# Patient Record
Sex: FEMALE | Race: BLACK OR AFRICAN AMERICAN | NOT HISPANIC OR LATINO | Employment: FULL TIME | URBAN - METROPOLITAN AREA
[De-identification: names, ages, dates, MRNs, and addresses within clinical notes are randomized per-mention and may not be internally consistent; named-entity substitution may affect disease eponyms.]

---

## 2017-01-09 ENCOUNTER — PATIENT MESSAGE (OUTPATIENT)
Dept: PAIN MEDICINE | Facility: CLINIC | Age: 32
End: 2017-01-09

## 2017-01-18 ENCOUNTER — HOSPITAL ENCOUNTER (OUTPATIENT)
Dept: RADIOLOGY | Facility: OTHER | Age: 32
Discharge: HOME | End: 2017-01-18
Attending: NURSE PRACTITIONER
Payer: COMMERCIAL

## 2017-01-18 ENCOUNTER — OFFICE VISIT (OUTPATIENT)
Dept: PAIN MEDICINE | Facility: CLINIC | Age: 32
End: 2017-01-18
Payer: COMMERCIAL

## 2017-01-18 VITALS
WEIGHT: 163.38 LBS | SYSTOLIC BLOOD PRESSURE: 120 MMHG | RESPIRATION RATE: 18 BRPM | HEIGHT: 62 IN | DIASTOLIC BLOOD PRESSURE: 83 MMHG | HEART RATE: 86 BPM | TEMPERATURE: 98 F | BODY MASS INDEX: 30.07 KG/M2

## 2017-01-18 DIAGNOSIS — M54.2 NECK PAIN: Primary | ICD-10-CM

## 2017-01-18 DIAGNOSIS — M54.2 NECK PAIN: ICD-10-CM

## 2017-01-18 DIAGNOSIS — M54.16 LEFT LUMBAR RADICULOPATHY: ICD-10-CM

## 2017-01-18 DIAGNOSIS — M51.16 LUMBAR DISC HERNIATION WITH RADICULOPATHY: ICD-10-CM

## 2017-01-18 DIAGNOSIS — M53.3 SACROILIAC JOINT PAIN: ICD-10-CM

## 2017-01-18 DIAGNOSIS — M54.2 CERVICALGIA: ICD-10-CM

## 2017-01-18 PROCEDURE — 99213 OFFICE O/P EST LOW 20 MIN: CPT | Mod: S$GLB,,, | Performed by: NURSE PRACTITIONER

## 2017-01-18 PROCEDURE — 72052 X-RAY EXAM NECK SPINE 6/>VWS: CPT | Mod: TC

## 2017-01-18 PROCEDURE — 1159F MED LIST DOCD IN RCRD: CPT | Mod: S$GLB,,, | Performed by: NURSE PRACTITIONER

## 2017-01-18 PROCEDURE — 99999 PR PBB SHADOW E&M-EST. PATIENT-LVL III: CPT | Mod: PBBFAC,,, | Performed by: NURSE PRACTITIONER

## 2017-01-18 PROCEDURE — 72052 X-RAY EXAM NECK SPINE 6/>VWS: CPT | Mod: 26,,, | Performed by: INTERNAL MEDICINE

## 2017-01-18 RX ORDER — DICLOFENAC SODIUM 20 MG/G
2 SOLUTION TOPICAL 2 TIMES DAILY
Qty: 2 BOTTLE | Refills: 2 | Status: SHIPPED | OUTPATIENT
Start: 2017-01-18 | End: 2017-04-18

## 2017-01-18 NOTE — PROGRESS NOTES
Chronic Pain - Established Visit    Referring Physician: No ref. provider found    Chief Complaint: Back Pain    Interval History 1/18/2017:  The patient returns today for follow up of lower back and left leg.  It radiates down the back of her leg to her calf.  She did have left L5 and S1 TF BRIA on 8/31/16 with 80% relief for about 4 months.  She would like to repeat the injection.  She denies any bowel or bladder incontinence or signs of saddle paresthesia.  She is also complaining of right sided neck/shoulder and right hand pain.  This began a few weeks ago and is worse at night.  She denies any numbness or tingling.  She reports that the pain begins in her right index finger and shoots upward into her right shoulder.  She does take Flexeril but it causes sedation.  Her pain today is 6/10.        Interval History 8/23/2016:  The patient returns today for follow up of lower back pain.  The pain radiates down the back of her leg to her lateral ankle.  She denies any radiation into her toes or heel at this time.  She has intermittent numbness while seated.  She is taking Mobic 7.5 mg daily and Flexeril PRN with mild relief.  She has been participating in PT at [facility] with limited relief.  She has a known herniation at L5-S1 with left NF narrowing from previous MRI.  She would like to further discuss an injection.  Her pain today is 5/10.  The patient denies any bowel or bladder incontinence or signs of saddle paresthesia.  The patient denies any major medical changes since last office visit.    Initial Encounter 7/8/2016:    Cheryl Kemp presents to the clinic for the evaluation of low back pain. 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    Brief history:     Pain Description:    The pain is located in the lumbar area and radiates to the left lower extremity in the L4/5 distribution.      At BEST   1/10     At WORST  9/10 on the WORST day.      On average pain is rated as 6/10.     Today the pain is rated as 4/10    The pain is described as sharp and shooting      Symptoms interfere with daily activity, sleeping and work.     Exacerbating factors: Sitting, Bending, Lifting and Getting out of bed/chair.      Mitigating factors ice, laying down and medications.     Patient denies night fever/night sweats, urinary incontinence, bowel incontinence, significant weight loss, significant motor weakness and loss of sensations.  Patient denies any suicidal or homicidal ideations    Pain Medications:  Current:  Mobic 7.5 mg QD  Flexeril 10mg qhs  Elavil 25mg at night    Tried in Past:  NSAIDs - yes  SNRI -Never  Anti-convulsants -Never  Muscle Relaxants - yes  Opioids-Vicodin -without relief    Physical Therapy/Home Exercise: yes      report:  Reviewed and consistent with medication use as prescribed.    Pain Procedures:   8/31/2016- Left L5 and S1 TF BRIA- 80% relief for 4 months    Chiropractor -never  Acupuncture - never  TENS unit -never  Spinal decompression -never  Joint replacement -never    Imaging:     Lumbar MRI- L5-S1 left disc herniation with NF narrowing.      Xray Lumbar 04/12/2016:  Comparison: MRI of the lumbar spine 1/2011.  Radiographs of the lumbar spine: 9/2006.  3/2005.    Number of views: 3.    Findings:  There are 5 non-rib bearing lumbar type vertebral bodies with vertebral body heights, alignment and intervertebral disc spaces maintained.  L5 has transitional nature as observed previously.  There is incomplete fusion of the posterior elements at S1, stable compared with earlier studies.    I do not detect spondylolysis or spondylolisthesis.    Lumbar disc spaces are maintained.  Posterior aspect of the L5-S1 disc space is incompletely visualized on lateral view likely due to obliquity.    Sacroiliac joints appear patent in their imaged extent.   Impression     The source of the patient's pain  is not established on this study.            Past Medical History   Diagnosis Date    Anemia     Cervix dysplasia     Hypoglycemia     Migraines, neuralgic      Past Surgical History   Procedure Laterality Date    Breast surgery Right 2008     MASS    Cryotherapy       18 years old     Social History     Social History    Marital status: Single     Spouse name: N/A    Number of children: N/A    Years of education: N/A     Occupational History    Not on file.     Social History Main Topics    Smoking status: Never Smoker    Smokeless tobacco: Never Used    Alcohol use No    Drug use: No    Sexual activity: Yes     Partners: Male     Birth control/ protection: None     Other Topics Concern    Not on file     Social History Narrative     Family History   Problem Relation Age of Onset    Diabetes Paternal Grandfather     Diabetes Paternal Grandmother     Diabetes Maternal Grandfather     Hypertension Father     Miscarriages / Stillbirths Mother     Endometriosis Mother     Breast cancer Other     Breast cancer Cousin     Breast cancer Cousin     Colon cancer Neg Hx     Ovarian cancer Neg Hx        Review of patient's allergies indicates:   Allergen Reactions    Amoxicillin      Stomach upset       Current Outpatient Prescriptions   Medication Sig    amitriptyline (ELAVIL) 25 MG tablet Take 25 mg by mouth nightly as needed. 1-3 Tablet Oral At bedtime    dexamethasone (DECADRON) 4 MG Tab Take 4 mg by mouth once daily.    escitalopram oxalate (LEXAPRO) 20 MG tablet     LIDOCAINE VISCOUS 2 % solution Take 10 mLs by mouth as directed.     No current facility-administered medications for this visit.        REVIEW OF SYSTEMS:    GENERAL:  No weight loss, malaise or fevers.  HEENT:   No recent changes in vision or hearing  NECK:  Negative for lumps, no difficulty with swallowing.  RESPIRATORY:  Negative for cough, wheezing or shortness of breath, patient denies any recent URI.  CARDIOVASCULAR:   "Negative for chest pain, leg swelling or palpitations.  GI:  Negative for abdominal discomfort, blood in stools or black stools or change in bowel habits.  MUSCULOSKELETAL:  See HPI.  SKIN:  Negative for lesions, rash, and itching.  PSYCH:  No mood disorder or recent psychosocial stressors.  Patient's sleep is disturbed secondary to pain.  HEMATOLOGY/LYMPHOLOGY:  Negative for prolonged bleeding, bruising easily or swollen nodes.  Patient is not currently taking any anti-coagulants.  ENDO: No history of diabetes or thyroid dysfunction  NEURO:   No history of headaches, syncope, paralysis, seizures or tremors.  All other reviewed and negative other than HPI.    OBJECTIVE:    Visit Vitals    /83    Pulse 86    Temp 98.4 °F (36.9 °C) (Oral)    Resp 18    Ht 5' 2" (1.575 m)    Wt 74.1 kg (163 lb 5.8 oz)    BMI 29.88 kg/m2       PHYSICAL EXAMINATION:    GENERAL: Well appearing, in no acute distress, alert and oriented x3.  PSYCH:  Mood and affect appropriate.  SKIN: Skin color, texture, turgor normal, no rashes or lesions.  HEAD/FACE:  Normocephalic, atraumatic. Cranial nerves grossly intact.  CV: RRR with palpation of the radial artery.  PULM: No evidence of respiratory difficulty, symmetric chest rise.  NECK: Full ROM with pain on extension and lateral rotation. There is pain with palpation to right trapezius.  Spurling was positive on the right side.  Negative facet loading.   BACK: Straight leg raising in the sitting and supine positions is positive to radicular pain on the left at the L5 distribution. There is no pain with palpation over the facet joints of the lumbar spine.  Full ROM with pain on extension. Positive facet loading to left.    EXTREMITIES: Peripheral joint ROM is full and pain free without obvious instability or laxity in all four extremities. No deformities, edema, or skin discoloration. Good capillary refill.  MUSCULOSKELETAL: Hip, and knee provocative maneuvers are negative.  There is " pain with palpation over the sacroiliac joint on the left.  There is no pain to palpation over the greater trochanteric bursa bilaterally.  FABERs test is positive on the left.  FADIRs test is negative.   Bilateral lower extremity strength is normal and symmetric.  No atrophy or tone abnormalities are noted.  NEURO: Bilateral upper and lower extremity coordination and muscle stretch reflexes are physiologic and symmetric.  Plantar response are downgoing. No clonus.  No loss of sensation is noted.  GAIT: Normal.      ASSESSMENT: 31 y.o. year old female with lower back and left leg pain, consistent with the following diagnoses:    Encounter Diagnoses   Name Primary?    Neck pain Yes    Left lumbar radiculopathy     Lumbar disc herniation with radiculopathy     Sacroiliac joint pain     Cervicalgia        PLAN:     - Previous imaging was reviewed and discussed with the patient today.    - Schedule left L5 and S1 TF BRIA. The procedure, risks, benefits and options were discussed with patient. There are no contraindications to the procedure. The patient expressed understanding and agreed to proceed.  Consent obtained today.    - Obtain cervical X-rays with flexion and extension to evaluate her pain.     - Trial Pennsaid for right shoulder and neck pain. Her pain today seems muscular in origin.     - In the future, consider obtaining cervical MRI if radicular symptoms continue.     - Continue Flexeril 10 mg PRN muscle spasms and Mobic 7.5 mg daily.      - The patient will continue a home exercise routine to help with pain and strengthening.      - F/U 2 weeks after procedure.    - Dr. Russo was consulted on the patient and agrees with this plan.      The above plan and management options were discussed at length with patient. Patient is in agreement with the above and verbalized understanding.     Alejandra Almazan  01/18/2017

## 2017-01-18 NOTE — MR AVS SNAPSHOT
[facility]  [address]  [place] [address]  Phone: [phone]  Fax: [phone]                  Cheryl Kemp   2017 7:40 AM   Office Visit    Description:  Female : 1985   Provider:  KRISTOPHER Hodge   Department:  [facility]           Reason for Visit     Low-back Pain     Hand Pain           Diagnoses this Visit        Comments    Neck pain    -  Primary     Left lumbar radiculopathy         Lumbar disc herniation with radiculopathy                To Do List           Goals (5 Years of Data)     None      [facility]     [facility] Nurse Care Line -  Assistance  Registered nurses in the [facility] provide clinical advisement, health education, appointment booking, and other advisory services.  Call for this free service at [phone].             Medications           Message regarding Medications     Verify the changes and/or additions to your medication regime listed below are the same as discussed with your clinician today.  If any of these changes or additions are incorrect, please notify your healthcare provider.             Verify that the below list of medications is an accurate representation of the medications you are currently taking.  If none reported, the list may be blank. If incorrect, please contact your healthcare provider. Carry this list with you in case of emergency.           Current Medications     amitriptyline (ELAVIL) 25 MG tablet Take 25 mg by mouth nightly as needed. 1-3 Tablet Oral At bedtime    dexamethasone (DECADRON) 4 MG Tab Take 4 mg by mouth once daily.    escitalopram oxalate (LEXAPRO) 20 MG tablet     LIDOCAINE VISCOUS 2 % solution Take 10 mLs by mouth as directed.           Clinical Reference Information           Vital Signs - Last Recorded  Most recent update: 2017  8:42 AM by Ciarra Guo MA    BP Pulse Temp Resp Ht Wt    120/83 86 98.4 °F (36.9 °C) (Oral) 18  "5' 2" (1.575 m) 74.1 kg (163 lb 5.8 oz)    BMI                29.88 kg/m2          Blood Pressure          Most Recent Value    BP  120/83      Allergies as of 1/18/2017     Amoxicillin      Immunizations Administered on Date of Encounter - 1/18/2017     None      Orders Placed During Today's Visit     Future Labs/Procedures Expected by Expires    X-Ray Cervical Spine 5 View W Flex Extxt  1/18/2017 1/18/2018      "

## 2017-02-22 ENCOUNTER — SURGERY (OUTPATIENT)
Age: 32
End: 2017-02-22

## 2017-02-22 ENCOUNTER — HOSPITAL ENCOUNTER (OUTPATIENT)
Facility: OTHER | Age: 32
Discharge: HOME | End: 2017-02-22
Attending: ANESTHESIOLOGY | Admitting: ANESTHESIOLOGY
Payer: COMMERCIAL

## 2017-02-22 VITALS
RESPIRATION RATE: 18 BRPM | HEIGHT: 63 IN | OXYGEN SATURATION: 100 % | WEIGHT: 160 LBS | HEART RATE: 85 BPM | SYSTOLIC BLOOD PRESSURE: 120 MMHG | TEMPERATURE: 98 F | BODY MASS INDEX: 28.35 KG/M2 | DIASTOLIC BLOOD PRESSURE: 8 MMHG

## 2017-02-22 DIAGNOSIS — M54.16 LUMBAR RADICULOPATHY: ICD-10-CM

## 2017-02-22 DIAGNOSIS — M51.16 LUMBAR DISC HERNIATION WITH RADICULOPATHY: Primary | ICD-10-CM

## 2017-02-22 LAB
B-HCG UR QL: NEGATIVE
CTP QC/QA: YES

## 2017-02-22 PROCEDURE — 81025 URINE PREGNANCY TEST: CPT | Performed by: ANESTHESIOLOGY

## 2017-02-22 PROCEDURE — 64483 NJX AA&/STRD TFRM EPI L/S 1: CPT | Performed by: ANESTHESIOLOGY

## 2017-02-22 PROCEDURE — 64483 NJX AA&/STRD TFRM EPI L/S 1: CPT | Mod: LT,,, | Performed by: ANESTHESIOLOGY

## 2017-02-22 PROCEDURE — 64484 NJX AA&/STRD TFRM EPI L/S EA: CPT | Performed by: ANESTHESIOLOGY

## 2017-02-22 PROCEDURE — 64484 NJX AA&/STRD TFRM EPI L/S EA: CPT | Mod: LT,,, | Performed by: ANESTHESIOLOGY

## 2017-02-22 PROCEDURE — 63600175 PHARM REV CODE 636 W HCPCS: Performed by: ANESTHESIOLOGY

## 2017-02-22 PROCEDURE — 25500020 PHARM REV CODE 255: Performed by: ANESTHESIOLOGY

## 2017-02-22 PROCEDURE — 99152 MOD SED SAME PHYS/QHP 5/>YRS: CPT | Mod: ,,, | Performed by: ANESTHESIOLOGY

## 2017-02-22 PROCEDURE — 25000003 PHARM REV CODE 250: Performed by: ANESTHESIOLOGY

## 2017-02-22 RX ORDER — METHYLPREDNISOLONE ACETATE 40 MG/ML
40 INJECTION, SUSPENSION INTRA-ARTICULAR; INTRALESIONAL; INTRAMUSCULAR; SOFT TISSUE ONCE
Status: DISCONTINUED | OUTPATIENT
Start: 2017-02-22 | End: 2017-02-22 | Stop reason: HOSPADM

## 2017-02-22 RX ORDER — BUPIVACAINE HYDROCHLORIDE 2.5 MG/ML
10 INJECTION, SOLUTION EPIDURAL; INFILTRATION; INTRACAUDAL ONCE
Status: DISCONTINUED | OUTPATIENT
Start: 2017-02-22 | End: 2017-02-22 | Stop reason: HOSPADM

## 2017-02-22 RX ORDER — LIDOCAINE HYDROCHLORIDE 10 MG/ML
10 INJECTION INFILTRATION; PERINEURAL
Status: DISCONTINUED | OUTPATIENT
Start: 2017-02-22 | End: 2017-02-22 | Stop reason: HOSPADM

## 2017-02-22 RX ORDER — METHYLPREDNISOLONE ACETATE 40 MG/ML
INJECTION, SUSPENSION INTRA-ARTICULAR; INTRALESIONAL; INTRAMUSCULAR; SOFT TISSUE
Status: DISCONTINUED | OUTPATIENT
Start: 2017-02-22 | End: 2017-02-22 | Stop reason: HOSPADM

## 2017-02-22 RX ORDER — SODIUM CHLORIDE 9 MG/ML
INJECTION, SOLUTION INTRAVENOUS CONTINUOUS
Status: DISCONTINUED | OUTPATIENT
Start: 2017-02-22 | End: 2017-02-22 | Stop reason: HOSPADM

## 2017-02-22 RX ORDER — BUPIVACAINE HYDROCHLORIDE 2.5 MG/ML
INJECTION, SOLUTION EPIDURAL; INFILTRATION; INTRACAUDAL
Status: DISCONTINUED | OUTPATIENT
Start: 2017-02-22 | End: 2017-02-22 | Stop reason: HOSPADM

## 2017-02-22 RX ORDER — MIDAZOLAM HYDROCHLORIDE 1 MG/ML
2 INJECTION INTRAMUSCULAR; INTRAVENOUS
Status: DISCONTINUED | OUTPATIENT
Start: 2017-02-22 | End: 2017-02-22 | Stop reason: HOSPADM

## 2017-02-22 RX ORDER — MIDAZOLAM HYDROCHLORIDE 1 MG/ML
INJECTION, SOLUTION INTRAMUSCULAR; INTRAVENOUS
Status: DISCONTINUED | OUTPATIENT
Start: 2017-02-22 | End: 2017-02-22 | Stop reason: HOSPADM

## 2017-02-22 RX ORDER — FENTANYL CITRATE 50 UG/ML
100 INJECTION, SOLUTION INTRAMUSCULAR; INTRAVENOUS ONCE
Status: DISCONTINUED | OUTPATIENT
Start: 2017-02-22 | End: 2017-02-22 | Stop reason: HOSPADM

## 2017-02-22 RX ORDER — LIDOCAINE HYDROCHLORIDE 10 MG/ML
INJECTION INFILTRATION; PERINEURAL
Status: DISCONTINUED | OUTPATIENT
Start: 2017-02-22 | End: 2017-02-22 | Stop reason: HOSPADM

## 2017-02-22 RX ADMIN — BUPIVACAINE HYDROCHLORIDE 10 ML: 2.5 INJECTION, SOLUTION EPIDURAL; INFILTRATION; INTRACAUDAL; PERINEURAL at 02:02

## 2017-02-22 RX ADMIN — METHYLPREDNISOLONE ACETATE 40 MG: 40 INJECTION, SUSPENSION INTRA-ARTICULAR; INTRALESIONAL; INTRAMUSCULAR; SOFT TISSUE at 02:02

## 2017-02-22 RX ADMIN — MIDAZOLAM 2 MG: 1 INJECTION INTRAMUSCULAR; INTRAVENOUS at 02:02

## 2017-02-22 RX ADMIN — IOHEXOL 5 ML: 300 INJECTION, SOLUTION INTRAVENOUS at 02:02

## 2017-02-22 RX ADMIN — SODIUM CHLORIDE: 0.9 INJECTION, SOLUTION INTRAVENOUS at 02:02

## 2017-02-22 RX ADMIN — LIDOCAINE HYDROCHLORIDE 10 ML: 10 INJECTION, SOLUTION INFILTRATION; PERINEURAL at 02:02

## 2017-02-22 NOTE — DISCHARGE INSTRUCTIONS

## 2017-02-22 NOTE — DISCHARGE SUMMARY
Discharge Note  Short Stay      SUMMARY     Admit Date: 2/22/2017    Attending Physician: Aleksandra Russo      Discharge Physician: Aleksandra Russo      Discharge Date: 2/22/2017 2:33 PM       PROCEDURE:    1)  Left  L5 TRANSFORAMINAL EPIDURAL STEROID INJECTION    2)  Left  S1 TRANSFORAMINAL EPIDURAL STEROID INJECTION    Pre Procedure diagnosis:    Left  L5 and S1  transforaminal stenosis with radiculopathy    Disposition: Home or self care    Patient Instructions:   Current Discharge Medication List      CONTINUE these medications which have NOT CHANGED    Details   amitriptyline (ELAVIL) 25 MG tablet Take 25 mg by mouth nightly as needed. 1-3 Tablet Oral At bedtime      escitalopram oxalate (LEXAPRO) 20 MG tablet Refills: 11      dexamethasone (DECADRON) 4 MG Tab Take 4 mg by mouth once daily.  Refills: 0      diclofenac sodium (PENNSAID) 20 mg/gram /actuation(2 %) sopm Apply 2 Pump topically 2 (two) times daily.  Qty: 2 Bottle, Refills: 2    Associated Diagnoses: Neck pain; Left lumbar radiculopathy; Lumbar disc herniation with radiculopathy; Sacroiliac joint pain; Cervicalgia      LIDOCAINE VISCOUS 2 % solution Take 10 mLs by mouth as directed.  Refills: 0             Resume home diet and activity

## 2017-02-22 NOTE — IP AVS SNAPSHOT
[facility] ([facility])  [address] [place], [place] [address]  Phone: [phone]           Patient Discharge Instructions     Our goal is to set you up for success. This packet includes information on your condition, medications, and your home care. It will help you to care for yourself so you don't get sicker and need to go back to the hospital.     Please ask your nurse if you have any questions.        There are many details to remember when preparing to leave the hospital. Here is what you will need to do:    1. Take your medicine. If you are prescribed medications, review your Medication List in the following pages. You may have new medications to  at the pharmacy and others that you'll need to stop taking. Review the instructions for how and when to take your medications. Talk with your doctor or nurses if you are unsure of what to do.     2. Go to your follow-up appointments. Specific follow-up information is listed in the following pages. Your may be contacted by a transition nurse or clinical provider about future appointments. Be sure we have all of the phone numbers to reach you, if needed. Please contact your provider's office if you are unable to make an appointment.     3. Watch for warning signs. Your doctor or nurse will give you detailed warning signs to watch for and when to call for assistance. These instructions may also include educational information about your condition. If you experience any of warning signs to your health, call your doctor.               [facility]  Unless otherwise directed by your provider, please contact [facility], our nurse care line that is available for 24/7 assistance.     [phone] (toll-free)    Registered nurses in the [facility] provide clinical advisement, health education, appointment booking, and other advisory services.                    ** Verify the list of medication(s) below is accurate and up to  date. Carry this with you in case of emergency. If your medications have changed, please notify your healthcare provider.             Medication List      CONTINUE taking these medications        Additional Info                      amitriptyline 25 MG tablet   Commonly known as:  ELAVIL   Refills:  0   Dose:  25 mg    Instructions:  Take 25 mg by mouth nightly as needed. 1-3 Tablet Oral At bedtime     Begin Date    AM    Noon    PM    Bedtime       dexamethasone 4 MG Tab   Commonly known as:  DECADRON   Refills:  0   Dose:  4 mg    Instructions:  Take 4 mg by mouth once daily.     Begin Date    AM    Noon    PM    Bedtime       diclofenac sodium 20 mg/gram /actuation(2 %) Sopm   Commonly known as:  PENNSAID   Quantity:  2 Bottle   Refills:  2   Dose:  2 Pump    Instructions:  Apply 2 Pump topically 2 (two) times daily.     Begin Date    AM    Noon    PM    Bedtime       escitalopram oxalate 20 MG tablet   Commonly known as:  LEXAPRO   Refills:  11      Begin Date    AM    Noon    PM    Bedtime       LIDOCAINE VISCOUS 2 % Soln   Refills:  0   Dose:  10 mL   Generic drug:  lidocaine HCl 2%    Instructions:  Take 10 mLs by mouth as directed.     Begin Date    AM    Noon    PM    Bedtime                  Please bring to all follow up appointments:    1. A copy of your discharge instructions.  2. All medicines you are currently taking in their original bottles.  3. Identification and insurance card.    Please arrive 15 minutes ahead of scheduled appointment time.    Please call 24 hours in advance if you must reschedule your appointment and/or time.        Your Scheduled Appointments     Mar 09, 2017  9:00 AM CST   Established Patient Visit with KRISTOPHER Hodge   [facility]    [address] [place] [address]   [phone]              Your Future Surgeries/Procedures     Feb 22, 2017   Surgery with Aleksandra Russo MD   [facility] ([facility])  "   [address]  [facility] [address]   [phone]                  Discharge Instructions       Home Care Instructions Pain Management:    1. DIET:   You may resume your normal diet today.   2. BATHING:   You may shower with luke warm water. No soaking in tub.  3. DRESSING:   You may remove your bandage today.   4. ACTIVITY LEVEL:   You may resume your normal activities 24 hrs after your procedure.  5. MEDICATIONS:   You may resume your normal medications today.   6. SPECIAL INSTRUCTIONS:   No heat to the injection site for 24 hrs including, bath or shower, heating pad, moist heat, or hot tubs.    Use ice pack to injection site for any pain or discomfort.  Apply ice packs for 20 minute intervals as needed.   If you have received any sedatives by mouth today you may not drive for 12 hours.    If you have received any sedation through your IV, you may not drive for 24 hrs.     PLEASE CALL YOUR DOCTOR IF:  1. Redness or swelling around the injection site.  2. Fever of 101 degrees  3. Drainage (pus) from the injection site.  4. For any continuous bleeding (some dried blood over the incision is normal.)  5. For severe headache that is relieved when lying flat.    FOR EMERGENCIES:   If any unusual problems or difficulties occur during clinic hours, call [phone] or 267.         Admission Information     Date & Time Provider Department CSN    2/22/2017  1:44 PM Aleksandra Russo MD [facility] [id number]      Care Providers     Provider Role Specialty Primary office phone    Aleksandra Russo MD Attending Provider Pain Medicine [phone]    Aleksandra Russo MD Surgeon  Pain Medicine [phone]      Your Vitals Were     BP Pulse Temp Resp Height Weight    118/69 (BP Location: Left arm, Patient Position: Lying, BP Method: Automatic) 83 98.3 °F (36.8 °C) (Oral) 18 5' 3" (1.6 m) 72.6 kg (160 lb)    SpO2 BMI             99% 28.34 kg/m2         Recent Lab Values     No lab values to " display.      Allergies as of 2/22/2017        Reactions    Amoxicillin     Stomach upset      Advance Directives     An advance directive is a document which, in the event you are no longer able to make decisions for yourself, tells your healthcare team what kind of treatment you do or do not want to receive, or who you would like to make those decisions for you.  If you do not currently have an advance directive, [facility] encourages you to create one.  For more information call:  [phone]), [phone] ([phone]),  or log on to [email or web address].        Language Assistance Services     ATTENTION: Language assistance services are available, free of charge. Please call [phone].      ATENCIÓN: Si habla español, tiene a dubon disposición servicios gratuitos de asistencia lingüística. Llame al [phone].     CHÚ Ý: N?u b?n nói Ti?ng Vi?t, có các d?ch v? h? tr? ngôn ng? mi?n phí dành cho b?n. G?i s? [phone].         [facility] complies with applicable Federal civil rights laws and does not discriminate on the basis of race, color, national origin, age, disability, or sex.

## 2017-02-22 NOTE — H&P
"HPI  32 yo F with known LBP and radiating L leg pain here for TFESI    PMHx, PSHx, Allergies, Medications reviewed in epic    ROS negative except pain complaints in HPI    OBJECTIVE:    Visit Vitals    /81 (BP Location: Right arm, Patient Position: Lying, BP Method: Automatic)    Pulse 72    Temp 98.3 °F (36.8 °C) (Oral)    Resp 18    Ht 5' 3" (1.6 m)    Wt 72.6 kg (160 lb)    SpO2 100%    BMI 28.34 kg/m2       PHYSICAL EXAMINATION:    GENERAL: Well appearing, in no acute distress, alert and oriented x3.  PSYCH:  Mood and affect appropriate.  SKIN: Skin color, texture, turgor normal, no rashes or lesions.  CV: RRR with palpation of the radial artery.  PULM: No evidence of respiratory difficulty, symmetric chest rise. Clear to auscultation.  NEURO: Cranial nerves grossly intact.    Plan:    Proceed with procedure as planned    Grant Gonzalez  02/22/2017    "

## 2017-02-22 NOTE — OP NOTE
INFORMED CONSENT: The procedure, risks, benefits and options were discussed with patient. There are no contraindications to the procedure. The patient expressed understanding and agreed to proceed. The personnel performing the procedure was discussed.    02/22/2017    Surgeon: Aleksandra Russo MD    Assistants: None    PROCEDURE:    1)  Left  L5 TRANSFORAMINAL EPIDURAL STEROID INJECTION    2)  Left  S1 TRANSFORAMINAL EPIDURAL STEROID INJECTION    Pre Procedure diagnosis:    Left  L5 and S1  transforaminal stenosis with radiculopathy    Post-Procedure diagnosis:   same    Complications: None    Specimens: None      DESCRIPTION OF PROCEDURE: The patient was brought to the procedure room. IV access was obtained prior to the procedure. The patient was positioned prone on the fluoroscopy table. Continuous hemodynamic monitoring was initiated including blood pressure, EKG, and pulse oximetry. . The skin was prepped with chlorhexidine and draped in a sterile fashion. Skin anesthesia was achieved using a total of 10mL of lidocaine, 5mL over each respective injection site.     The  L5 and S1  transforaminal spaces were identified with fluoroscopy in the  AP, oblique, and lateral views.  A 22 gauge spinal quinke needle was then advanced into the area of the trans foraminal spaces bilaterally with confirmation of proper needle position using AP, oblique, and lateral fluoroscopic views. Once the needle tip was in the area of the transforaminal space, and there was no blood, CSF or paraesthesias,  1.5 mL of Omnipaque 300mg/ml was injected on each side for a total of 3mL.  Fluoroscopic imaging in the AP and lateral views revealed a clear outline of the spinal nerve with proximal spread of agent through the neural foramen into the epidural space. A total combination of 1 mL of Bupivicaine 0.25% and 40 mg depo medrol was injected on each side for a total of 4mL of injected medications with displacement of the contrast dye confirming  that the medication went into the area of the transforaminal spaces bilaterally. A sterile dressing was applied.   Patient tolerated the procedure well.    Conscious sedation provided by M.D    The patient was monitored with continuous pulse oximetry, EKG, and intermittent blood pressure monitors.  The patient was hemodynamically stable throughout the entire process was responsive to voice, and breathing spontaneously.  Supplemental O2 was provided at 2L/min via nasal cannula.  Patient was comfortable for the duration of the procedure.    There was a total of 2mg IV Midazolam was titrated for the procedure    Patient was taken back to the recovery room for further observation.     The patient was discharged to home in stable condition

## 2017-04-12 DIAGNOSIS — F41.9 ANXIETY: ICD-10-CM

## 2017-04-12 RX ORDER — ESCITALOPRAM OXALATE 20 MG/1
TABLET ORAL
Qty: 30 TABLET | Refills: 0 | Status: SHIPPED | OUTPATIENT
Start: 2017-04-12 | End: 2017-05-31 | Stop reason: SDUPTHER

## 2017-05-31 DIAGNOSIS — F41.9 ANXIETY: ICD-10-CM

## 2017-06-01 RX ORDER — ESCITALOPRAM OXALATE 20 MG/1
TABLET ORAL
Qty: 30 TABLET | Refills: 0 | Status: SHIPPED | OUTPATIENT
Start: 2017-06-01 | End: 2017-10-20

## 2017-10-06 ENCOUNTER — PATIENT MESSAGE (OUTPATIENT)
Dept: FAMILY MEDICINE | Facility: CLINIC | Age: 32
End: 2017-10-06

## 2017-10-20 ENCOUNTER — OFFICE VISIT (OUTPATIENT)
Dept: FAMILY MEDICINE | Facility: CLINIC | Age: 32
End: 2017-10-20
Payer: MEDICAID

## 2017-10-20 VITALS
DIASTOLIC BLOOD PRESSURE: 70 MMHG | SYSTOLIC BLOOD PRESSURE: 113 MMHG | WEIGHT: 154.31 LBS | BODY MASS INDEX: 27.34 KG/M2 | HEIGHT: 63 IN | TEMPERATURE: 98 F | HEART RATE: 84 BPM

## 2017-10-20 DIAGNOSIS — F32.89 OTHER DEPRESSION: ICD-10-CM

## 2017-10-20 DIAGNOSIS — F41.9 ANXIETY: ICD-10-CM

## 2017-10-20 DIAGNOSIS — F51.01 PRIMARY INSOMNIA: Primary | ICD-10-CM

## 2017-10-20 DIAGNOSIS — I73.00 RAYNAUD'S DISEASE WITHOUT GANGRENE: ICD-10-CM

## 2017-10-20 PROCEDURE — 99214 OFFICE O/P EST MOD 30 MIN: CPT | Mod: S$PBB,,, | Performed by: FAMILY MEDICINE

## 2017-10-20 PROCEDURE — 99214 OFFICE O/P EST MOD 30 MIN: CPT | Mod: PBBFAC,PO | Performed by: FAMILY MEDICINE

## 2017-10-20 PROCEDURE — 99999 PR PBB SHADOW E&M-EST. PATIENT-LVL IV: CPT | Mod: PBBFAC,,, | Performed by: FAMILY MEDICINE

## 2017-10-20 RX ORDER — LORAZEPAM 0.5 MG/1
0.5 TABLET ORAL EVERY 12 HOURS PRN
Qty: 60 TABLET | Refills: 0 | Status: SHIPPED | OUTPATIENT
Start: 2017-10-20 | End: 2018-02-10

## 2017-10-20 RX ORDER — DULOXETIN HYDROCHLORIDE 30 MG/1
30 CAPSULE, DELAYED RELEASE ORAL DAILY
Qty: 30 CAPSULE | Refills: 11 | Status: SHIPPED | OUTPATIENT
Start: 2017-10-20 | End: 2018-02-10

## 2017-10-20 RX ORDER — AMITRIPTYLINE HYDROCHLORIDE 25 MG/1
25 TABLET, FILM COATED ORAL NIGHTLY PRN
Qty: 30 TABLET | Refills: 6 | Status: SHIPPED | OUTPATIENT
Start: 2017-10-20 | End: 2018-04-30 | Stop reason: SDUPTHER

## 2017-10-20 NOTE — PROGRESS NOTES
Subjective:       Patient ID: Cheryl Kemp is a 32 y.o. female.    Chief Complaint: Depression; Anxiety; Mood Swings; Chills (purple finger tips, bilateral); Anorexia (only on liquids); and Diarrhea   patient suffering from anxiety medications prescribed do not seem to be helping.  Patient is also having bluish color to the tips of her fingers when she gets cold  Which resolved promptly on warming her fingers.  HPI above  Review of Systems   Constitutional: Negative.    HENT: Negative.    Eyes: Negative.    Respiratory: Negative.    Cardiovascular: Negative.    Gastrointestinal: Positive for diarrhea.   Endocrine: Negative.    Genitourinary: Negative.    Allergic/Immunologic: Negative.    Neurological: Negative.    Hematological: Negative.    Psychiatric/Behavioral: Positive for decreased concentration, dysphoric mood and sleep disturbance. Negative for hallucinations, self-injury and suicidal ideas. The patient is nervous/anxious. The patient is not hyperactive.        Objective:      Physical Exam   Constitutional: She is oriented to person, place, and time. She appears well-developed and well-nourished. She appears distressed.   HENT:   Head: Normocephalic and atraumatic.   Right Ear: External ear normal.   Left Ear: External ear normal.   Eyes: Conjunctivae and EOM are normal. Pupils are equal, round, and reactive to light.   Neck: Normal range of motion. Neck supple. No JVD present.   Cardiovascular: Normal rate and regular rhythm.    Pulmonary/Chest: Effort normal and breath sounds normal.   Abdominal: Soft. Bowel sounds are normal. She exhibits no distension. There is no tenderness.   Musculoskeletal:        Hands:  Demarcated areas present areas patient describes turning purple and exhibiting Raynaud's phenomenon   Neurological: She is alert and oriented to person, place, and time. She displays normal reflexes. No cranial nerve deficit or sensory deficit. She exhibits normal muscle tone. Coordination  normal.   Skin: Skin is warm and dry. No rash noted. No erythema.   Nursing note and vitals reviewed.      Assessment:       1. Primary insomnia    2. Other depression    3. Anxiety    4. Raynaud's disease without gangrene        Plan:     patient counseling time 20 minutes regarding new medications situation and the need to pursue counseling possibly with a psychiatrist.    the med card dated 10/20/2017   amitriptyline (ELAVIL) 25 MG tablet         DILTIAZEM HCL (DILTIAZEM 2% CREAM) 3 times daily        lorazepam (ATIVAN) 0.5 MG tablet 0.5 mg, Every 12 hours PRN        Ambulatory referral to Psychiatry        Follow-up in 2 months to reevaluate medications

## 2017-11-21 ENCOUNTER — OFFICE VISIT (OUTPATIENT)
Dept: FAMILY MEDICINE | Facility: CLINIC | Age: 32
End: 2017-11-21
Payer: MEDICAID

## 2017-11-21 VITALS
WEIGHT: 163.13 LBS | BODY MASS INDEX: 28.9 KG/M2 | HEIGHT: 63 IN | HEART RATE: 90 BPM | TEMPERATURE: 98 F | SYSTOLIC BLOOD PRESSURE: 118 MMHG | DIASTOLIC BLOOD PRESSURE: 80 MMHG

## 2017-11-21 DIAGNOSIS — G56.00 CARPAL TUNNEL SYNDROME, UNSPECIFIED LATERALITY: Primary | ICD-10-CM

## 2017-11-21 DIAGNOSIS — G56.02 CARPAL TUNNEL SYNDROME OF LEFT WRIST: ICD-10-CM

## 2017-11-21 PROCEDURE — 99999 PR PBB SHADOW E&M-EST. PATIENT-LVL III: CPT | Mod: PBBFAC,,, | Performed by: FAMILY MEDICINE

## 2017-11-21 PROCEDURE — 99214 OFFICE O/P EST MOD 30 MIN: CPT | Mod: S$PBB,,, | Performed by: FAMILY MEDICINE

## 2017-11-21 PROCEDURE — 99213 OFFICE O/P EST LOW 20 MIN: CPT | Mod: PBBFAC,PO | Performed by: FAMILY MEDICINE

## 2017-11-21 RX ORDER — DICLOFENAC SODIUM 50 MG/1
TABLET, DELAYED RELEASE ORAL
Qty: 40 TABLET | Refills: 0 | Status: SHIPPED | OUTPATIENT
Start: 2017-11-21 | End: 2018-02-10

## 2017-11-21 NOTE — PATIENT INSTRUCTIONS
Carpal Tunnel Syndrome Prevention Tips  Some repetitive hand activities put you at higher risk for carpal tunnel syndrome (CTS). But you can reduce your risk. Learn how to change the way you use your hands. Below are tips for at home and on the job. Be sure to also follow the hand and wrist safety policies at your workplace.      Keep your wrist in a neutral (straight) position when exercising.      Keep your wrist in neutral  Keep a neutral (straight) wrist position as often as you can. Dont use your wrist in a bent (flexed) position for long periods of time. This includes extended or twisted positions.  Watch your   Dont just use your thumb and index finger to grasp or lift. This can put stress on your wrist. When you can, use your whole hand and all its fingers to grasp an object.  Minimize repetition  Dont move your arms or hands or hold an object in the same way for long periods of time. Even simple, light tasks can cause injury this way. Instead, alternate tasks or switch hands.  Rest your hands  Give your hands a break from time to time with a rest. Even a few minutes once an hour can help.  Reduce speed and force  Slow down the speed in which you do a forceful, repetitive motion. This gives your wrist time to recover from the effort. Use power tools to help reduce the force.  Strengthen the muscles  Weak muscles may lead to a poor wrist or arm position. Exercises will make your hand and arm muscles stronger. This can help you keep a better position.  Date Last Reviewed: 9/11/2015  © 7207-5335 [employer]. [address] [place] [address], [place] [address]. All rights reserved. This information is not intended as a substitute for professional medical care. Always follow your healthcare professional's instructions.        Understanding Carpal Tunnel Syndrome    The carpal tunnel is a narrow space inside the wrist. It is ringed by bone and a band of tough tissue called the transverse carpal  ligament. A major nerve called the median nerve runs from the forearm into the hand through the carpal tunnel. Tendons also run through the carpal tunnel.  With carpal tunnel syndrome, the tendons or nearby tissues within the carpal tunnel may swell or thicken. Or the transverse carpal ligament may harden and shorten. This narrows the space in the carpal tunnel and puts pressure on the median nerve. This pressure leads to tingling and numbness of the hand and wrist. In time, the condition can make even simple tasks hard to do.  What causes carpal tunnel syndrome?  Doctors arent entirely clear why the condition occurs. Certain things may make a person more likely to have it. These include:  · Being female  · Being pregnant  · Being overweight  · Having diabetes or rheumatoid arthritis  Symptoms of carpal tunnel syndrome  Symptoms often come and go. At first, symptoms may occur mainly at night. Later, they may be noticed during the day as well. They may get worse with activities such as driving, reading, typing, or holding a phone. Symptoms can include:  · Tingling and numbness in the hand or wrist  · Sharp pain that shoots up the arm or down to the fingers  · Hand stiffness or cramping, especially in the morning  · Trouble making a fist  · Hand weakness and clumsiness  Treatment for carpal tunnel syndrome  Certain treatments help reduce the pressure on the median nerve and relieve symptoms. Choices for treatment may include one or more of the following:  · Wrist splint. This involves wearing a special brace on the wrist and hand. The splint holds the wrist straight, in a neutral position. This helps keep the carpal tunnel as open as possible.  · Cortisone shots. Cortisone is a medicine that helps reduce swelling. It is injected directly into the wrist. It helps shrink tissues inside the carpal tunnel. This relieves symptoms for a time.  · Pain medicines. You may take over-the-counter or prescription medicines to  help reduce swelling and relieve symptoms.  · Surgery. If the condition doesnt respond to other treatments and doesnt go away on its own, you may need surgery. During surgery, the surgeon cuts the transverse carpal ligament to relieve pressure on the median nerve.     When to call your healthcare provider  Call your healthcare provider right away if you have any of these:  · Fever of 100.4°F (38°C) or higher, or as directed  · Symptoms that dont get better, or get worse  · New symptoms   Date Last Reviewed: 3/10/2016  © 8869-9563 [employer] [address] [place] [address]. All rights reserved. This information is not intended as a substitute for professional medical care. Always follow your healthcare professional's instructions.        Carpal Tunnel Syndrome    Carpal tunnel syndrome is a painful condition of the wrist and arm. It is caused by pressure on the median nerve.  The median nerve is one of the nerves that give feeling and movement to the hand. It passes through a tunnel in the wrist called the carpal tunnel. This tunnel is made up of bones and ligaments. Narrowing of this tunnel or swelling of the tissues inside the tunnel puts pressure on the median nerve. This causes numbness, pins and needles, or electric shooting pains in your hand and forearm. Often the pain is worse at night and may wake you when you are asleep.  Carpal tunnel syndrome may occur during pregnancy and with use of birth control pills. It is more common in workers who must often bend their wrists. It is also common in people who work with power tools that cause strong vibrations.  Home care  · Rest the painful wrist. Avoid repeated bending of the wrist back and forth. This puts pressure on the median nerve. Avoid using power tools with strong vibrations.  · If you were given a splint, wear it at night while you sleep. You may also wear it during the day for comfort.  · Move your fingers and wrists often to  avoid stiffness.  · Elevate your arms on pillows when you lie down.  · Try using the unaffected hand more.  · Try not to hold your wrists in a bent, downward position.  · Sometimes changes in the work place may ease symptoms. If you type most of the day, it may help to change the position of your keyboard or add a wrist support. Your wrist should be in a neutral position and not bent back when typing.  · You may use over-the-counter pain medicine to treat pain and inflammation, unless another medicine was prescribed. Anti-inflammatory pain medicines, such as ibuprofen or naproxen may be more effective than acetaminophen, which treats pain, but not inflammation. If you have chronic liver or kidney disease or ever had a stomach ulcer or GI bleeding, talk with your doctor before using these medicines.  · Opioid pain medicine will only give temporary relief and does not treat the problem. If pain continues, you may need a shot of a steroid drug into your wrist.  · If the above methods fail, you may need surgery. This will open the carpal tunnel and release the pressure on the trapped nerve.  Follow-up care  Follow up with your healthcare provider, or as advised, if the pain doesnt begin to improve within the next week.  If X-rays were taken, you will be notified of any new findings that may affect your care.  When to seek medical advice  Call your healthcare provider right away if any of these occur:  · Pain not improving with the above treatment  · Fingers or hand become cold, blue, numb, or tingly  · Your whole arm becomes swollen or weak  Date Last Reviewed: 11/23/2015  © 8999-6009 [employer]. [address] [place] [address], [place] [address]. All rights reserved. This information is not intended as a substitute for professional medical care. Always follow your healthcare professional's instructions.

## 2017-11-22 NOTE — PROGRESS NOTES
Subjective:       Patient ID: Cheryl Kemp is a 32 y.o. female.    Chief Complaint: Hand Pain (left hand, thumb)   patient experiencing left arm pain sharp burning quality and worse at nighttime.  Patient is right hand dominant.  She works as a nursery .  Patient denies any history of injury or remote or recent.  HPI above  Review of Systems   HENT: Negative.    Eyes: Negative.    Respiratory: Negative.    Cardiovascular: Negative.    Gastrointestinal: Negative.    Endocrine: Negative.    Genitourinary: Negative.    Musculoskeletal: Positive for arthralgias and myalgias.   Skin: Negative.    Allergic/Immunologic: Negative.    Neurological: Positive for weakness and numbness.   Hematological: Negative.    Psychiatric/Behavioral: Negative.        Objective:      Physical Exam   Constitutional: She is oriented to person, place, and time. She appears well-developed and well-nourished. No distress.   HENT:   Head: Normocephalic and atraumatic.   Eyes: Conjunctivae and EOM are normal. Pupils are equal, round, and reactive to light.   Neck: Neck supple. No JVD present.   Pulmonary/Chest: Effort normal.   Musculoskeletal:        Right wrist: Normal.        Left wrist: She exhibits normal range of motion, no tenderness, no bony tenderness, no swelling, no effusion, no crepitus, no deformity and no laceration.        Right hand: Normal.        Left hand: Decreased sensation noted. Decreased sensation is present in the medial distribution. Decreased strength noted. She exhibits wrist extension trouble.   Negative Tinel's on the right and left  Positive Phalen's on the left negative Phalen's on the right.   Neurological: She is alert and oriented to person, place, and time. She has normal reflexes. She displays atrophy. She displays no tremor and normal reflexes. No cranial nerve deficit or sensory deficit. She exhibits normal muscle tone. She displays a negative Romberg sign. She displays no seizure  activity. Coordination and gait normal. GCS eye subscore is 4. GCS verbal subscore is 5. GCS motor subscore is 6.   Skin: Skin is warm and dry. No rash noted. She is not diaphoretic. No erythema. No pallor.   Psychiatric: She has a normal mood and affect. Her behavior is normal. Judgment and thought content normal.   Nursing note and vitals reviewed.      Assessment:       1. Carpal tunnel syndrome, unspecified laterality     2.     .  Left thumb pain rule out tendinitis  Plan:         use daily at bedtime ×1 month carpal tunnel splint to the left hand         EMG - 4 Extremities contact the patient results available        Further med card dated 11/29/2017   diclofenac (VOLTAREN) 50 MG EC tablet        Patient was given wrist strengthening exercises to help prevent carpal tunnel

## 2018-01-02 ENCOUNTER — NURSE TRIAGE (OUTPATIENT)
Dept: ADMINISTRATIVE | Facility: CLINIC | Age: 33
End: 2018-01-02

## 2018-04-04 RX ORDER — LORAZEPAM 0.5 MG/1
TABLET ORAL
Qty: 60 TABLET | Refills: 0 | OUTPATIENT
Start: 2018-04-04

## 2018-04-30 DIAGNOSIS — F51.01 PRIMARY INSOMNIA: ICD-10-CM

## 2018-04-30 RX ORDER — AMITRIPTYLINE HYDROCHLORIDE 25 MG/1
TABLET, FILM COATED ORAL
Qty: 30 TABLET | Refills: 0 | Status: SHIPPED | OUTPATIENT
Start: 2018-04-30 | End: 2018-06-13 | Stop reason: SDUPTHER

## 2018-06-13 ENCOUNTER — TELEPHONE (OUTPATIENT)
Dept: FAMILY MEDICINE | Facility: CLINIC | Age: 33
End: 2018-06-13

## 2018-06-13 DIAGNOSIS — F51.01 PRIMARY INSOMNIA: ICD-10-CM

## 2018-06-13 RX ORDER — AMITRIPTYLINE HYDROCHLORIDE 25 MG/1
25 TABLET, FILM COATED ORAL NIGHTLY
Qty: 30 TABLET | Refills: 11 | Status: SHIPPED | OUTPATIENT
Start: 2018-06-13 | End: 2019-06-03

## 2018-06-13 NOTE — TELEPHONE ENCOUNTER
Message left on voicemail informing patient that Rx has been sent to pharmacy as requested and to return call regarding her question about an appointment.

## 2018-06-13 NOTE — TELEPHONE ENCOUNTER
LOV 11/21/17 with . LOV with Dr. Area 07/05/16. Message sent instructing patient to contact appointment desk to schedule annual physical exam and fasting labs prior. Please advise.

## 2018-06-13 NOTE — TELEPHONE ENCOUNTER
----- Message from Marialuisa Rodriguez sent at 6/13/2018 11:49 AM CDT -----  Contact: self/[phone]  Pt called in regards to getting a med refill and  issues with right shoulder appointment. She wanted to be seen sooner then aug.    Please advise

## 2018-06-14 NOTE — TELEPHONE ENCOUNTER
----- Message from Mera Rocha sent at 6/14/2018 11:27 AM CDT -----  Contact: Self [phone]  Patient is returning a phone call.  Who left a message for the patient: Katy   Does patient know what this is regarding: Shoulder Appt    Comments:

## 2018-06-18 ENCOUNTER — OFFICE VISIT (OUTPATIENT)
Dept: FAMILY MEDICINE | Facility: CLINIC | Age: 33
End: 2018-06-18
Payer: MEDICAID

## 2018-06-18 VITALS
DIASTOLIC BLOOD PRESSURE: 68 MMHG | WEIGHT: 140.44 LBS | BODY MASS INDEX: 24.88 KG/M2 | HEART RATE: 85 BPM | TEMPERATURE: 98 F | HEIGHT: 63 IN | SYSTOLIC BLOOD PRESSURE: 108 MMHG

## 2018-06-18 DIAGNOSIS — S16.1XXA STRAIN OF CERVICAL PORTION OF BOTH TRAPEZIUS MUSCLES: Primary | ICD-10-CM

## 2018-06-18 DIAGNOSIS — F41.9 ANXIETY: ICD-10-CM

## 2018-06-18 PROCEDURE — 99213 OFFICE O/P EST LOW 20 MIN: CPT | Mod: PBBFAC,PO | Performed by: FAMILY MEDICINE

## 2018-06-18 PROCEDURE — 99999 PR PBB SHADOW E&M-EST. PATIENT-LVL III: CPT | Mod: PBBFAC,,, | Performed by: FAMILY MEDICINE

## 2018-06-18 PROCEDURE — 99213 OFFICE O/P EST LOW 20 MIN: CPT | Mod: S$PBB,,, | Performed by: FAMILY MEDICINE

## 2018-06-18 RX ORDER — TIZANIDINE HYDROCHLORIDE 4 MG/1
1 CAPSULE, GELATIN COATED ORAL 3 TIMES DAILY PRN
Qty: 30 CAPSULE | Refills: 3 | Status: SHIPPED | OUTPATIENT
Start: 2018-06-18 | End: 2018-06-28

## 2018-06-18 RX ORDER — ESCITALOPRAM OXALATE 10 MG/1
10 TABLET ORAL DAILY
Qty: 30 TABLET | Refills: 11 | Status: SHIPPED | OUTPATIENT
Start: 2018-06-18 | End: 2018-11-06

## 2018-06-18 RX ORDER — NABUMETONE 750 MG/1
750 TABLET, FILM COATED ORAL 2 TIMES DAILY
Qty: 30 TABLET | Refills: 3 | Status: SHIPPED | OUTPATIENT
Start: 2018-06-18 | End: 2018-10-09

## 2018-06-18 NOTE — PROGRESS NOTES
Subjective:       Patient ID: Cheryl Kemp is a 32 y.o. female.    Chief Complaint: Shoulder Pain (both for 3 weeks)    Disclaimer: This note has been generated using voice-recognition software. There may be typographical errors that have been missed during proof-reading    33 yo presents today for evaluation of recurrent neck pain, present for several weeks.  No history of trauma, pain started on right side, now involving left side as well. No radiation of pain, not improved with Ibuprofen.  No history of trauma      Shoulder Pain    Pertinent negatives include no numbness.     Review of Systems   Musculoskeletal: Positive for neck pain. Negative for neck stiffness.   Neurological: Negative for weakness and numbness.   Psychiatric/Behavioral: Negative for dysphoric mood, self-injury, sleep disturbance and suicidal ideas. The patient is nervous/anxious.         Recurrent anxiety symptoms, worse after she weaned herself off Lexapro       Objective:      Physical Exam   Constitutional: She appears well-developed and well-nourished. No distress.   Neck: Normal range of motion. Neck supple. No JVD present. No thyromegaly present.   Bilateral tenderness to palpation, spasm over both trapezius  No tenderness over lateral/posterior cervical spine   Lymphadenopathy:     She has no cervical adenopathy.   Neurological: She displays normal reflexes.   Psychiatric: Her speech is normal and behavior is normal. Thought content normal. Her mood appears anxious. Cognition and memory are normal. She does not exhibit a depressed mood. She expresses no suicidal ideation.       Assessment:       1. Strain of cervical portion of both trapezius muscles    2. Anxiety        Plan:       1.  Relafen, Tizanidine, f/u in 2 weeks if not improved to schedule PT  2.  Restart Lexapro, f/u 2-3 weeks

## 2018-06-19 ENCOUNTER — TELEPHONE (OUTPATIENT)
Dept: FAMILY MEDICINE | Facility: CLINIC | Age: 33
End: 2018-06-19

## 2018-06-20 NOTE — TELEPHONE ENCOUNTER
Received fax from [facility] stating that tizanidine tablets are covered under plan. Pharmacy notified. Pharmacy reports that tablets went through and they will fill.

## 2018-06-30 ENCOUNTER — PATIENT MESSAGE (OUTPATIENT)
Dept: FAMILY MEDICINE | Facility: CLINIC | Age: 33
End: 2018-06-30

## 2018-06-30 DIAGNOSIS — M54.50 BILATERAL LOW BACK PAIN WITHOUT SCIATICA, UNSPECIFIED CHRONICITY: Primary | ICD-10-CM

## 2018-10-09 ENCOUNTER — OFFICE VISIT (OUTPATIENT)
Dept: URGENT CARE | Facility: CLINIC | Age: 33
End: 2018-10-09
Payer: MEDICAID

## 2018-10-09 ENCOUNTER — NURSE TRIAGE (OUTPATIENT)
Dept: ADMINISTRATIVE | Facility: CLINIC | Age: 33
End: 2018-10-09

## 2018-10-09 VITALS
HEART RATE: 102 BPM | TEMPERATURE: 98 F | DIASTOLIC BLOOD PRESSURE: 88 MMHG | RESPIRATION RATE: 14 BRPM | BODY MASS INDEX: 25.76 KG/M2 | WEIGHT: 140 LBS | OXYGEN SATURATION: 98 % | SYSTOLIC BLOOD PRESSURE: 127 MMHG | HEIGHT: 62 IN

## 2018-10-09 DIAGNOSIS — H65.93 FLUID LEVEL BEHIND TYMPANIC MEMBRANE OF BOTH EARS: Primary | ICD-10-CM

## 2018-10-09 PROCEDURE — 99214 OFFICE O/P EST MOD 30 MIN: CPT | Mod: S$GLB,,, | Performed by: PHYSICIAN ASSISTANT

## 2018-10-09 RX ORDER — DEXAMETHASONE SODIUM PHOSPHATE 100 MG/10ML
5 INJECTION INTRAMUSCULAR; INTRAVENOUS
Status: COMPLETED | OUTPATIENT
Start: 2018-10-09 | End: 2018-10-09

## 2018-10-09 RX ADMIN — DEXAMETHASONE SODIUM PHOSPHATE 5 MG: 100 INJECTION INTRAMUSCULAR; INTRAVENOUS at 02:10

## 2018-10-09 NOTE — LETTER
October 9, 2018      [facility]  [place] [address]  Phone: [phone]  Fax: [phone]       Patient: Cheryl Kemp   YOB: 1985  Date of Visit: 10/09/2018    To Whom It May Concern:    Chyna Kemp  was at [facility] on 10/09/2018. She may return to work/school on 10/10/2018 with no restrictions. If you have any questions or concerns, or if I can be of further assistance, please do not hesitate to contact me.    Sincerely,    Emy Ho PA-C

## 2018-10-09 NOTE — PATIENT INSTRUCTIONS
Take ibuprofen as needed for pain.    Below are suggestions for symptomatic relief:   -Tylenol every 4 hours OR ibuprofen every 6 hours as needed for pain/fever.   -Salt water gargles to soothe throat pain.   -Chloroseptic spray also helps to numb throat pain.   -Nasal saline spray reduces inflammation and dryness.   -Warm face compresses to help with facial sinus pain/pressure.   -Vicks vapor rub at night.   -Flonase OTC or Nasacort OTC for nasal congestion.   -Simple foods like chicken noodle soup.   -Delsym helps with coughing at night   -Zyrtec/Claritin during the day & Benadryl at night may help with allergies.     If you DO NOT have Hypertension or any history of palpitations, it is ok to take over the counter Sudafed or Mucinex D or Allegra-D or Claritin-D or Zyrtec-D.  If you do take one of the above, it is ok to combine that with plain over the counter Mucinex or Allegra or Claritin or Zyrtec. If, for example, you are taking Zyrtec -D, you can combine that with Mucinex, but not Mucinex-D.  If you are taking Mucinex-D, you can combine that with plain Allegra or Claritin or Zyrtec.   If you DO have Hypertension or palpitations, it is safe to take Coricidin HBP for relief of sinus symptoms.    Please follow up with your primary care provider within 2-5 days if your signs and symptoms have not resolved or worsen.     If your condition worsens or fails to improve we recommend that you receive another evaluation at the emergency room immediately or contact your primary medical clinic to discuss your concerns.   You must understand that you have received an Urgent Care treatment only and that you may be released before all of your medical problems are known or treated. You, the patient, will arrange for follow up care as instructed.

## 2018-10-09 NOTE — TELEPHONE ENCOUNTER
Really bad headache since yesterday, left ear pain started yesterrday, and dizyy spells when she stands up, only lasts a few seconds.  clammy skin with all over body warm sensation.  Some nausea, no vomiting.  Taking sudafed, and ibuprofen.  Slight chest pain yesterday, 3-5 minutes, felt like sharp pressure in the center of her chest.    Unable to find an urgent appt today, warm transferred to , advised if unable to get an appt today, she should go in to [facility].    Reason for Disposition   Patient wants to be seen    Protocols used: ST DIZZINESS-A-[facility]

## 2018-10-09 NOTE — PROGRESS NOTES
"Subjective:       Patient ID: Cheryl Kemp is a 33 y.o. female.    Vitals:  height is 5' 2" (1.575 m) and weight is 63.5 kg (140 lb). Her oral temperature is 98.4 °F (36.9 °C). Her blood pressure is 127/88 and her pulse is 102. Her respiration is 14 and oxygen saturation is 98%.     Chief Complaint: Otalgia    Otalgia    There is pain in both ears. This is a new problem. The current episode started yesterday. The problem occurs constantly. The problem has been gradually worsening. There has been no fever. The pain is at a severity of 8/10. The pain is moderate. Associated symptoms include headaches and hearing loss. Pertinent negatives include no abdominal pain, coughing or sore throat. She has tried NSAIDs (sudafed) for the symptoms. The treatment provided mild relief.     Review of Systems   Constitution: Negative for chills, fever and malaise/fatigue.   HENT: Positive for ear pain, hearing loss and hoarse voice. Negative for congestion and sore throat.    Eyes: Negative for discharge and redness.   Cardiovascular: Negative for chest pain, dyspnea on exertion and leg swelling.   Respiratory: Negative for cough, shortness of breath, sputum production and wheezing.    Musculoskeletal: Negative for myalgias.   Gastrointestinal: Positive for nausea. Negative for abdominal pain.   Neurological: Positive for headaches.       Objective:      Physical Exam   Constitutional: She is oriented to person, place, and time. Vital signs are normal. She appears well-developed and well-nourished. She does not appear ill. No distress.   HENT:   Head: Normocephalic and atraumatic.   Right Ear: Hearing, external ear and ear canal normal. Tympanic membrane is bulging. A middle ear effusion (clear) is present.   Left Ear: Hearing, external ear and ear canal normal. Tympanic membrane is bulging. A middle ear effusion (clear) is present.   Nose: Nose normal. No mucosal edema. Right sinus exhibits no maxillary sinus tenderness and no " frontal sinus tenderness. Left sinus exhibits no maxillary sinus tenderness and no frontal sinus tenderness.   Mouth/Throat: Uvula is midline and oropharynx is clear and moist. No oropharyngeal exudate, posterior oropharyngeal edema or posterior oropharyngeal erythema.   Eyes: Conjunctivae, EOM and lids are normal. Right eye exhibits no discharge. Left eye exhibits no discharge.   Neck: Normal range of motion. Neck supple.   Cardiovascular: Normal rate, regular rhythm and normal heart sounds. Exam reveals no gallop and no friction rub.   No murmur heard.  Pulmonary/Chest: Effort normal and breath sounds normal. No stridor. No respiratory distress. She has no decreased breath sounds. She has no wheezes. She has no rhonchi. She has no rales.   Musculoskeletal: Normal range of motion.   Lymphadenopathy:        Head (right side): No submandibular and no tonsillar adenopathy present.        Head (left side): No submandibular and no tonsillar adenopathy present.   Neurological: She is alert and oriented to person, place, and time.   Skin: Skin is warm and dry. No rash noted. She is not diaphoretic. No erythema. No pallor.   Psychiatric: She has a normal mood and affect. Her behavior is normal.   Nursing note and vitals reviewed.      Assessment:       1. Fluid level behind tympanic membrane of both ears        Plan:         Fluid level behind tympanic membrane of both ears  -     dexamethasone injection 5 mg; Inject 0.5 mLs (5 mg total) into the muscle one time.      Patient Instructions   Take ibuprofen as needed for pain.    Below are suggestions for symptomatic relief:   -Tylenol every 4 hours OR ibuprofen every 6 hours as needed for pain/fever.   -Salt water gargles to soothe throat pain.   -Chloroseptic spray also helps to numb throat pain.   -Nasal saline spray reduces inflammation and dryness.   -Warm face compresses to help with facial sinus pain/pressure.   -Vicks vapor rub at night.   -Flonase OTC or Nasacort  OTC for nasal congestion.   -Simple foods like chicken noodle soup.   -Delsym helps with coughing at night   -Zyrtec/Claritin during the day & Benadryl at night may help with allergies.     If you DO NOT have Hypertension or any history of palpitations, it is ok to take over the counter Sudafed or Mucinex D or Allegra-D or Claritin-D or Zyrtec-D.  If you do take one of the above, it is ok to combine that with plain over the counter Mucinex or Allegra or Claritin or Zyrtec. If, for example, you are taking Zyrtec -D, you can combine that with Mucinex, but not Mucinex-D.  If you are taking Mucinex-D, you can combine that with plain Allegra or Claritin or Zyrtec.   If you DO have Hypertension or palpitations, it is safe to take Coricidin HBP for relief of sinus symptoms.    Please follow up with your primary care provider within 2-5 days if your signs and symptoms have not resolved or worsen.     If your condition worsens or fails to improve we recommend that you receive another evaluation at the emergency room immediately or contact your primary medical clinic to discuss your concerns.   You must understand that you have received an Urgent Care treatment only and that you may be released before all of your medical problems are known or treated. You, the patient, will arrange for follow up care as instructed.

## 2019-03-08 DIAGNOSIS — F51.01 PRIMARY INSOMNIA: ICD-10-CM

## 2019-03-08 RX ORDER — AMITRIPTYLINE HYDROCHLORIDE 25 MG/1
TABLET, FILM COATED ORAL
Qty: 30 TABLET | Refills: 0 | Status: SHIPPED | OUTPATIENT
Start: 2019-03-08 | End: 2019-04-04

## 2019-04-04 ENCOUNTER — NURSE TRIAGE (OUTPATIENT)
Dept: ADMINISTRATIVE | Facility: CLINIC | Age: 34
End: 2019-04-04

## 2019-04-04 NOTE — TELEPHONE ENCOUNTER
Trying  To schedule an appt to see a physician.  Has seen Dr. Nielson since she was 16, dx long ago with anxiety and depression.  Has been a long time since this has happened to her, and hasn't been taking medication for it.     Had a mild panic attack yesterday, having what feels like palpitations and chest pain.  She's wedding planning and has a lot going on at work, and relates this episode to the increased stress. She will go to [facility] now, and we made a f/u appt with Dr. Nielson for next week.  Reason for Disposition   Chest pain   SEVERE chest pain    Protocols used: ANXIETY AND PANIC ATTACK-A-OH, CHEST PAIN-A-OH

## 2019-04-07 DIAGNOSIS — F51.01 PRIMARY INSOMNIA: ICD-10-CM

## 2019-04-07 RX ORDER — AMITRIPTYLINE HYDROCHLORIDE 25 MG/1
TABLET, FILM COATED ORAL
Qty: 30 TABLET | Refills: 0 | Status: SHIPPED | OUTPATIENT
Start: 2019-04-07 | End: 2019-05-10 | Stop reason: SDUPTHER

## 2019-04-09 ENCOUNTER — OFFICE VISIT (OUTPATIENT)
Dept: FAMILY MEDICINE | Facility: CLINIC | Age: 34
End: 2019-04-09
Payer: MEDICAID

## 2019-04-09 VITALS
BODY MASS INDEX: 23.43 KG/M2 | TEMPERATURE: 99 F | HEART RATE: 82 BPM | HEIGHT: 63 IN | SYSTOLIC BLOOD PRESSURE: 120 MMHG | DIASTOLIC BLOOD PRESSURE: 78 MMHG | WEIGHT: 132.25 LBS

## 2019-04-09 DIAGNOSIS — F41.1 GAD (GENERALIZED ANXIETY DISORDER): Primary | ICD-10-CM

## 2019-04-09 PROCEDURE — 99213 OFFICE O/P EST LOW 20 MIN: CPT | Mod: S$PBB,,, | Performed by: FAMILY MEDICINE

## 2019-04-09 PROCEDURE — 99999 PR PBB SHADOW E&M-EST. PATIENT-LVL III: CPT | Mod: PBBFAC,,, | Performed by: FAMILY MEDICINE

## 2019-04-09 PROCEDURE — 99213 OFFICE O/P EST LOW 20 MIN: CPT | Mod: PBBFAC,PO | Performed by: FAMILY MEDICINE

## 2019-04-09 PROCEDURE — 99213 PR OFFICE/OUTPT VISIT, EST, LEVL III, 20-29 MIN: ICD-10-PCS | Mod: S$PBB,,, | Performed by: FAMILY MEDICINE

## 2019-04-09 PROCEDURE — 99999 PR PBB SHADOW E&M-EST. PATIENT-LVL III: ICD-10-PCS | Mod: PBBFAC,,, | Performed by: FAMILY MEDICINE

## 2019-04-09 RX ORDER — ESCITALOPRAM OXALATE 10 MG/1
10 TABLET ORAL DAILY
Qty: 30 TABLET | Refills: 11 | Status: SHIPPED | OUTPATIENT
Start: 2019-04-09 | End: 2019-05-28

## 2019-04-09 NOTE — PROGRESS NOTES
Subjective:       Patient ID: Cheryl Kemp is a 33 y.o. female.    Chief Complaint: Anxiety (seen in ED on Wed.); Depression; Cough (at night); Palpitations; and Chest Pain    32 yo with prior documented history of CHARLEE and PTSD presents for follow up.  Over past few months, she decided to stop Lexapro on her own.  She has been having recurrent anxiety symptoms with rapid heart rates and dyspnea, which were recently evaluated in an ED visit on 4/4/19.  She denies suicidal ideation, dysphoria although has had frequent crying spells.  Pt had done well while on Lexapro in the past    Review of Systems   Constitutional: Negative for activity change and unexpected weight change.   HENT: Positive for rhinorrhea. Negative for hearing loss and trouble swallowing.    Eyes: Negative for discharge and visual disturbance.   Respiratory: Negative for chest tightness and wheezing.    Cardiovascular: Positive for chest pain and palpitations.   Gastrointestinal: Negative for blood in stool, constipation, diarrhea and vomiting.   Endocrine: Negative for polydipsia and polyuria.   Genitourinary: Negative for difficulty urinating, dysuria, hematuria and menstrual problem.   Musculoskeletal: Positive for neck pain. Negative for arthralgias and joint swelling.   Neurological: Negative for weakness and headaches.   Psychiatric/Behavioral: Negative for agitation, behavioral problems, confusion, decreased concentration, dysphoric mood, hallucinations, self-injury, sleep disturbance and suicidal ideas. The patient is nervous/anxious. The patient is not hyperactive.        Objective:      Physical Exam   Constitutional: She is oriented to person, place, and time. She appears well-developed and well-nourished. No distress.   Cardiovascular: Normal rate and regular rhythm.   No murmur heard.  Pulmonary/Chest: Effort normal and breath sounds normal. She has no wheezes. She has no rales.   Neurological: She is alert and oriented to person,  place, and time. She displays normal reflexes.   Psychiatric: Her speech is normal and behavior is normal. Judgment and thought content normal. Her mood appears anxious. She is not agitated and not aggressive. Cognition and memory are normal. She exhibits a depressed mood. She expresses no suicidal ideation. She expresses no suicidal plans.       Assessment:       1. CHARLEE (generalized anxiety disorder)        Plan:     1.  Restart Lexapro 10mg  2.  F/u 2 weeks

## 2019-04-29 ENCOUNTER — PATIENT MESSAGE (OUTPATIENT)
Dept: FAMILY MEDICINE | Facility: CLINIC | Age: 34
End: 2019-04-29

## 2019-05-10 DIAGNOSIS — F51.01 PRIMARY INSOMNIA: ICD-10-CM

## 2019-05-11 RX ORDER — AMITRIPTYLINE HYDROCHLORIDE 25 MG/1
TABLET, FILM COATED ORAL
Qty: 30 TABLET | Refills: 0 | Status: SHIPPED | OUTPATIENT
Start: 2019-05-11 | End: 2019-06-03

## 2019-05-28 ENCOUNTER — PATIENT MESSAGE (OUTPATIENT)
Dept: FAMILY MEDICINE | Facility: CLINIC | Age: 34
End: 2019-05-28

## 2019-05-28 DIAGNOSIS — F41.1 GAD (GENERALIZED ANXIETY DISORDER): ICD-10-CM

## 2019-05-28 DIAGNOSIS — F32.A DEPRESSION, UNSPECIFIED DEPRESSION TYPE: Primary | ICD-10-CM

## 2019-05-28 RX ORDER — ESCITALOPRAM OXALATE 20 MG/1
20 TABLET ORAL DAILY
Qty: 30 TABLET | Refills: 11 | Status: SHIPPED | OUTPATIENT
Start: 2019-05-28 | End: 2021-05-31

## 2019-06-03 ENCOUNTER — PATIENT MESSAGE (OUTPATIENT)
Dept: PRIMARY CARE CLINIC | Facility: CLINIC | Age: 34
End: 2019-06-03

## 2019-06-03 RX ORDER — AMITRIPTYLINE HYDROCHLORIDE 50 MG/1
50 TABLET, FILM COATED ORAL NIGHTLY
Qty: 30 TABLET | Refills: 11 | Status: SHIPPED | OUTPATIENT
Start: 2019-06-03 | End: 2021-08-18 | Stop reason: SDUPTHER

## 2019-08-28 ENCOUNTER — TELEPHONE (OUTPATIENT)
Dept: PRIMARY CARE CLINIC | Facility: CLINIC | Age: 34
End: 2019-08-28

## 2019-08-28 NOTE — TELEPHONE ENCOUNTER
----- Message from Sean Garcia sent at 8/28/2019 12:06 PM CDT -----  Contact: self    Patient would like to get medical advice.  Symptoms (please be specific): rectal bleeding    How long has patient had these symptoms: 1 month     Pharmacy name and phone # (copy/paste from chart):    Any drug allergies (copy/paste from chart):      Would the patient rather a call back or a response via [facility]?:  Call back   Comments:  Patient would like an appointment soon as possible has medicaid please advise

## 2019-08-28 NOTE — TELEPHONE ENCOUNTER
Patient states the rectal bleeding occurs every time that she has a bowel movement, denies straining.  Medium red in color with a jelly like substance.  Not experiencing any discomfort.  Appointment scheduled with Dr. Dawson for tomorrow at 3:40pm

## 2020-10-08 ENCOUNTER — PATIENT MESSAGE (OUTPATIENT)
Dept: PRIMARY CARE CLINIC | Facility: CLINIC | Age: 35
End: 2020-10-08

## 2021-05-25 ENCOUNTER — TELEPHONE (OUTPATIENT)
Dept: PRIMARY CARE CLINIC | Facility: CLINIC | Age: 36
End: 2021-05-25

## 2021-05-31 ENCOUNTER — OFFICE VISIT (OUTPATIENT)
Dept: PRIMARY CARE CLINIC | Facility: CLINIC | Age: 36
End: 2021-05-31
Payer: MEDICAID

## 2021-05-31 VITALS
DIASTOLIC BLOOD PRESSURE: 70 MMHG | OXYGEN SATURATION: 99 % | HEIGHT: 63 IN | BODY MASS INDEX: 19.14 KG/M2 | SYSTOLIC BLOOD PRESSURE: 94 MMHG | WEIGHT: 108 LBS | HEART RATE: 80 BPM

## 2021-05-31 DIAGNOSIS — J30.2 SEASONAL ALLERGIES: ICD-10-CM

## 2021-05-31 DIAGNOSIS — G56.02 CARPAL TUNNEL SYNDROME OF LEFT WRIST: ICD-10-CM

## 2021-05-31 DIAGNOSIS — M51.16 LUMBAR DISC HERNIATION WITH RADICULOPATHY: ICD-10-CM

## 2021-05-31 DIAGNOSIS — G40.909 SEIZURE DISORDER: ICD-10-CM

## 2021-05-31 DIAGNOSIS — G44.029 CHRONIC CLUSTER HEADACHE, NOT INTRACTABLE: ICD-10-CM

## 2021-05-31 DIAGNOSIS — M54.16 LUMBAR RADICULOPATHY: ICD-10-CM

## 2021-05-31 DIAGNOSIS — Z76.89 ENCOUNTER TO ESTABLISH CARE: ICD-10-CM

## 2021-05-31 DIAGNOSIS — Z87.898 HISTORY OF WEIGHT LOSS: ICD-10-CM

## 2021-05-31 DIAGNOSIS — Z00.00 ANNUAL PHYSICAL EXAM: Primary | ICD-10-CM

## 2021-05-31 DIAGNOSIS — M47.816 FACET ARTHROPATHY, LUMBAR: ICD-10-CM

## 2021-05-31 DIAGNOSIS — F41.0 PANIC ATTACK: ICD-10-CM

## 2021-05-31 DIAGNOSIS — F41.9 ANXIETY: ICD-10-CM

## 2021-05-31 DIAGNOSIS — F43.10 PTSD (POST-TRAUMATIC STRESS DISORDER): ICD-10-CM

## 2021-05-31 PROCEDURE — 99395 PR PREVENTIVE VISIT,EST,18-39: ICD-10-PCS | Mod: S$PBB,,, | Performed by: FAMILY MEDICINE

## 2021-05-31 PROCEDURE — 99999 PR PBB SHADOW E&M-EST. PATIENT-LVL IV: ICD-10-PCS | Mod: PBBFAC,,, | Performed by: FAMILY MEDICINE

## 2021-05-31 PROCEDURE — 99999 PR PBB SHADOW E&M-EST. PATIENT-LVL IV: CPT | Mod: PBBFAC,,, | Performed by: FAMILY MEDICINE

## 2021-05-31 PROCEDURE — 99214 OFFICE O/P EST MOD 30 MIN: CPT | Mod: PBBFAC,PN | Performed by: FAMILY MEDICINE

## 2021-05-31 PROCEDURE — 99395 PREV VISIT EST AGE 18-39: CPT | Mod: S$PBB,,, | Performed by: FAMILY MEDICINE

## 2021-05-31 RX ORDER — LAMOTRIGINE 100 MG/1
100 TABLET ORAL 2 TIMES DAILY
COMMUNITY
Start: 2021-05-21

## 2021-05-31 RX ORDER — CLONAZEPAM 0.5 MG/1
0.5 TABLET ORAL 3 TIMES DAILY PRN
Qty: 12 TABLET | Refills: 0 | Status: SHIPPED | OUTPATIENT
Start: 2021-05-31

## 2021-06-01 ENCOUNTER — PATIENT MESSAGE (OUTPATIENT)
Dept: PRIMARY CARE CLINIC | Facility: CLINIC | Age: 36
End: 2021-06-01

## 2021-06-01 RX ORDER — ESCITALOPRAM OXALATE 10 MG/1
10 TABLET ORAL DAILY
Qty: 90 TABLET | Refills: 3 | Status: SHIPPED | OUTPATIENT
Start: 2021-06-01 | End: 2021-11-11

## 2021-08-17 ENCOUNTER — PATIENT MESSAGE (OUTPATIENT)
Dept: PRIMARY CARE CLINIC | Facility: CLINIC | Age: 36
End: 2021-08-17

## 2021-08-18 RX ORDER — AMITRIPTYLINE HYDROCHLORIDE 50 MG/1
50 TABLET, FILM COATED ORAL NIGHTLY
Qty: 90 TABLET | Refills: 3 | Status: SHIPPED | OUTPATIENT
Start: 2021-08-18 | End: 2022-08-18

## 2021-11-11 ENCOUNTER — PATIENT MESSAGE (OUTPATIENT)
Dept: PRIMARY CARE CLINIC | Facility: CLINIC | Age: 36
End: 2021-11-11
Payer: MEDICAID

## 2021-11-11 RX ORDER — ESCITALOPRAM OXALATE 20 MG/1
20 TABLET ORAL DAILY
Qty: 90 TABLET | Refills: 3 | Status: SHIPPED | OUTPATIENT
Start: 2021-11-11 | End: 2022-11-11

## 2022-05-30 ENCOUNTER — PATIENT MESSAGE (OUTPATIENT)
Dept: ADMINISTRATIVE | Facility: HOSPITAL | Age: 37
End: 2022-05-30
Payer: MEDICAID

## 2023-01-25 ENCOUNTER — PATIENT MESSAGE (OUTPATIENT)
Dept: ADMINISTRATIVE | Facility: HOSPITAL | Age: 38
End: 2023-01-25
Payer: MEDICAID

## 2025-07-23 DIAGNOSIS — G40.89 OTHER SEIZURES: Primary | ICD-10-CM

## 2025-07-28 ENCOUNTER — TELEPHONE (OUTPATIENT)
Dept: NEUROLOGY | Facility: CLINIC | Age: 40
End: 2025-07-28
Payer: OTHER GOVERNMENT

## 2025-07-28 DIAGNOSIS — G40.509 EPILEPTIC SEIZURES RELATED TO EXTERNAL CAUSES, NOT INTRACTABLE, WITHOUT STATUS EPILEPTICUS: Primary | ICD-10-CM

## 2025-07-28 NOTE — TELEPHONE ENCOUNTER
Scheduled EMU 9/25/25, 11am. Aware an adult 18 or older is required to accompany her for the duration including overnight. Aware she will be connected to lines to her head, chest, arm, have an IV placed; will not be able to leave the room until all equipment is removed at discharge. Aware visitors must be over the age of 12. Instructions thoroughly discussed; mailed via [facility]. Denies any special needs.

## (undated) DEVICE — BANDAGE ADHESIVE